# Patient Record
Sex: OTHER/UNKNOWN | Race: WHITE | URBAN - METROPOLITAN AREA
[De-identification: names, ages, dates, MRNs, and addresses within clinical notes are randomized per-mention and may not be internally consistent; named-entity substitution may affect disease eponyms.]

---

## 2017-01-18 ENCOUNTER — APPOINTMENT (OUTPATIENT)
Dept: URBAN - METROPOLITAN AREA CLINIC 290 | Age: 52
Setting detail: DERMATOLOGY
End: 2017-01-19

## 2017-01-18 DIAGNOSIS — Z41.9 ENCOUNTER FOR PROCEDURE FOR PURPOSES OTHER THAN REMEDYING HEALTH STATE, UNSPECIFIED: ICD-10-CM

## 2017-01-18 PROCEDURE — OTHER BOTOX (U OR CC): OTHER

## 2017-01-18 PROCEDURE — OTHER FILLERS (CC): OTHER

## 2017-01-18 PROCEDURE — OTHER OTHER (COSMETIC): OTHER

## 2017-01-18 PROCEDURE — OTHER PRESCRIPTION: OTHER

## 2017-01-18 RX ORDER — PREDNISONE 20 MG/1
TABLET ORAL
Qty: 2 | Refills: 0 | Status: ERX | COMMUNITY
Start: 2017-01-18

## 2017-01-18 NOTE — PROCEDURE: OTHER (COSMETIC)
Detail Level: Zone
Other (Free Text): Patient aware that he may need another half syringe of Botox in the crows feet.

## 2017-01-18 NOTE — PROCEDURE: FILLERS (CC)
Consent: Written consent obtained. Risks include but not limited to bruising, beading, irregular texture, ulceration, infection, allergic reaction, scar formation, incomplete augmentation, temporary nature, procedural pain.\\n\\nPatient prepped in a sterile fashion, photos taken and consent signed.

## 2017-03-16 ENCOUNTER — APPOINTMENT (OUTPATIENT)
Dept: URBAN - METROPOLITAN AREA CLINIC 290 | Age: 52
Setting detail: DERMATOLOGY
End: 2017-03-21

## 2017-03-16 DIAGNOSIS — Z41.9 ENCOUNTER FOR PROCEDURE FOR PURPOSES OTHER THAN REMEDYING HEALTH STATE, UNSPECIFIED: ICD-10-CM

## 2017-03-16 PROCEDURE — OTHER BOTOX (U OR CC): OTHER

## 2017-03-16 NOTE — PROCEDURE: BOTOX (U OR CC)
Detail Level: Zone
Additional Area 4 Location: Right lower lid
Additional Area 1 Location: Crows feet
Consent: Verbal consent obtained. Risks include but not limited to lid/brow ptosis, bruising, swelling, diplopia, temporary effect, incomplete chemical denervation.
Additional Area 3 Units: 0
Document As Units Or Cc?: units
Dilution (U/0.1 Cc): 2.4
Additional Area 2 Location: Glabella
Price (Use Numbers Only, No Special Characters Or $): 400.00
Additional Area 2 Units: 20
Additional Area 3 Location: Lip

## 2017-05-19 ENCOUNTER — APPOINTMENT (OUTPATIENT)
Dept: URBAN - METROPOLITAN AREA CLINIC 290 | Age: 52
Setting detail: DERMATOLOGY
End: 2017-05-21

## 2017-05-19 DIAGNOSIS — Z41.9 ENCOUNTER FOR PROCEDURE FOR PURPOSES OTHER THAN REMEDYING HEALTH STATE, UNSPECIFIED: ICD-10-CM

## 2017-05-19 PROCEDURE — OTHER FILLERS: OTHER

## 2017-05-19 NOTE — PROCEDURE: FILLERS
Price (Use Numbers Only, No Special Characters Or $): 8404 Price (Use Numbers Only, No Special Characters Or $): 1159

## 2017-05-25 ENCOUNTER — APPOINTMENT (OUTPATIENT)
Dept: URBAN - METROPOLITAN AREA CLINIC 290 | Age: 52
Setting detail: DERMATOLOGY
End: 2017-05-29

## 2017-05-25 PROBLEM — D23.72 OTHER BENIGN NEOPLASM OF SKIN OF LEFT LOWER LIMB, INCLUDING HIP: Status: ACTIVE | Noted: 2017-05-25

## 2017-05-25 PROCEDURE — 11100: CPT

## 2017-05-25 PROCEDURE — OTHER BIOPSY BY SHAVE METHOD: OTHER

## 2017-05-25 PROCEDURE — OTHER COUNSELING: OTHER

## 2017-05-25 NOTE — PROCEDURE: BIOPSY BY SHAVE METHOD
Biopsy Type: H and E
Notification Instructions: Patient will be notified of biopsy results. However, patient instructed to call the office if not contacted within 2 weeks.
Hemostasis: Aluminum Chloride and Electrocautery
Bill 52083 For Specimen Handling/Conveyance To Laboratory?: no
Additional Anesthesia Volume In Cc (Will Not Render If 0): 0
Path Notes (To The Dermatopathologist): 6mm lipkin
Wound Care: Aquaphor
Curettage Text: The wound bed was treated with curettage after the biopsy was performed.
Silver Nitrate Text: The wound bed was treated with silver nitrate after the biopsy was performed.
Detail Level: Detailed
Anesthesia Volume In Cc (Will Not Render If 0): 1
Size Of Lesion In Cm: 0.6
Post-Care Instructions: I reviewed with the patient in detail post-care instructions. Patient is to keep the biopsy site dry overnight, and then apply Vaseline twice daily until healed.
Cryotherapy Text: The wound bed was treated with cryotherapy after the biopsy was performed.
Consent: Written consent was obtained and risks were reviewed including but not limited to scarring, infection, bleeding, scabbing, incomplete removal, nerve damage and allergy to anesthesia.
Electrodesiccation And Curettage Text: The wound bed was treated with electrodesiccation and curettage after the biopsy was performed.
Electrodesiccation Text: The wound bed was treated with electrodesiccation after the biopsy was performed.
Billing Type: Third-Party Bill
Type Of Destruction Used: Curettage
Anesthesia Type: 1% lidocaine with 1:100,000 epinephrine and a 1:6 solution of 8.4% sodium bicarbonate
Biopsy Method: Dermablade
Dressing: waterproof dressing

## 2017-07-13 ENCOUNTER — APPOINTMENT (OUTPATIENT)
Dept: URBAN - METROPOLITAN AREA CLINIC 290 | Age: 52
Setting detail: DERMATOLOGY
End: 2017-07-18

## 2017-07-13 DIAGNOSIS — Z41.9 ENCOUNTER FOR PROCEDURE FOR PURPOSES OTHER THAN REMEDYING HEALTH STATE, UNSPECIFIED: ICD-10-CM

## 2017-07-13 PROBLEM — D23.72 OTHER BENIGN NEOPLASM OF SKIN OF LEFT LOWER LIMB, INCLUDING HIP: Status: ACTIVE | Noted: 2017-07-13

## 2017-07-13 PROCEDURE — OTHER OTHER: OTHER

## 2017-07-13 PROCEDURE — OTHER COUNSELING: OTHER

## 2017-07-13 PROCEDURE — OTHER BOTOX (U OR CC): OTHER

## 2017-07-13 PROCEDURE — OTHER DEFER: OTHER

## 2017-07-13 PROCEDURE — 99213 OFFICE O/P EST LOW 20 MIN: CPT

## 2017-07-13 NOTE — PROCEDURE: BOTOX (U OR CC)
Detail Level: Zone
Additional Area 2 Units: 20
Forehead Units/Cc: 0
Consent: Verbal consent obtained. Risks include but not limited to lid/brow ptosis, bruising, swelling, diplopia, temporary effect, incomplete chemical denervation.
Additional Area 5 Location: Lip
Price (Use Numbers Only, No Special Characters Or $): 450
Additional Area 2 Location: crow's feet
Additional Area 1 Location: glabella
Document As Units Or Cc?: units
Additional Area 1 Units: 25
Dilution (U/0.1 Cc): 2.4

## 2017-07-13 NOTE — PROCEDURE: OTHER
Detail Level: Zone
Note Text (......Xxx Chief Complaint.): This diagnosis correlates with the
Other (Free Text): Warned patient of risk of recurrence and/or scarring.

## 2017-07-13 NOTE — PROCEDURE: DEFER
Instructions (Optional): Dermatofibroma is itchy and rubs on clothing
Procedure To Be Performed At Next Visit: Biopsy by punch method
Detail Level: Detailed

## 2017-08-10 ENCOUNTER — APPOINTMENT (OUTPATIENT)
Dept: URBAN - METROPOLITAN AREA CLINIC 290 | Age: 52
Setting detail: DERMATOLOGY
End: 2017-08-20

## 2017-08-10 DIAGNOSIS — Z41.9 ENCOUNTER FOR PROCEDURE FOR PURPOSES OTHER THAN REMEDYING HEALTH STATE, UNSPECIFIED: ICD-10-CM

## 2017-08-10 PROCEDURE — OTHER BOTOX (U OR CC): OTHER

## 2017-08-10 PROCEDURE — OTHER OTHER: OTHER

## 2017-08-10 NOTE — PROCEDURE: OTHER
Other (Free Text): Recommended voluma for under eyes to treat the area around right eye that he's concerned about
Note Text (......Xxx Chief Complaint.): This diagnosis correlates with the
Detail Level: Zone

## 2017-08-10 NOTE — PROCEDURE: BOTOX (U OR CC)
Depressor Anguli Oris Units: 0
Detail Level: Zone
Dilution (U/0.1 Cc): 2.4
Additional Area 5 Location: Lip
Document As Units Or Cc?: units
Additional Area 2 Location: crow's feet
Consent: Verbal consent obtained. Risks include but not limited to lid/brow ptosis, bruising, swelling, diplopia, temporary effect, incomplete chemical denervation.
Additional Area 2 Units: 5

## 2017-12-07 ENCOUNTER — APPOINTMENT (OUTPATIENT)
Dept: URBAN - METROPOLITAN AREA CLINIC 290 | Age: 52
Setting detail: DERMATOLOGY
End: 2017-12-21

## 2017-12-07 DIAGNOSIS — Z41.9 ENCOUNTER FOR PROCEDURE FOR PURPOSES OTHER THAN REMEDYING HEALTH STATE, UNSPECIFIED: ICD-10-CM

## 2017-12-07 PROCEDURE — OTHER BOTOX (U OR CC): OTHER

## 2017-12-07 NOTE — PROCEDURE: BOTOX (U OR CC)
Additional Area 1 Location: [facility] Additional Area 1 Location: [facility]

## 2018-05-02 ENCOUNTER — APPOINTMENT (OUTPATIENT)
Dept: URBAN - METROPOLITAN AREA CLINIC 290 | Age: 53
Setting detail: DERMATOLOGY
End: 2018-05-11

## 2018-05-02 DIAGNOSIS — Z41.9 ENCOUNTER FOR PROCEDURE FOR PURPOSES OTHER THAN REMEDYING HEALTH STATE, UNSPECIFIED: ICD-10-CM

## 2018-05-02 PROCEDURE — OTHER BOTOX (U OR CC): OTHER

## 2018-05-02 NOTE — PROCEDURE: BOTOX (U OR CC)
Additional Area 4 Location: [facility] Additional Area 4 Location: [facility]

## 2018-06-06 ENCOUNTER — APPOINTMENT (OUTPATIENT)
Dept: URBAN - METROPOLITAN AREA CLINIC 290 | Age: 53
Setting detail: DERMATOLOGY
End: 2018-06-15

## 2018-06-06 DIAGNOSIS — Z41.9 ENCOUNTER FOR PROCEDURE FOR PURPOSES OTHER THAN REMEDYING HEALTH STATE, UNSPECIFIED: ICD-10-CM

## 2018-06-06 PROCEDURE — OTHER OTHER (COSMETIC): OTHER

## 2018-06-06 PROCEDURE — OTHER FILLERS: OTHER

## 2018-06-06 NOTE — PROCEDURE: FILLERS
Price (Use Numbers Only, No Special Characters Or $): 8057 Price (Use Numbers Only, No Special Characters Or $): 5290

## 2018-06-27 ENCOUNTER — APPOINTMENT (OUTPATIENT)
Dept: URBAN - METROPOLITAN AREA CLINIC 290 | Age: 53
Setting detail: DERMATOLOGY
End: 2018-07-08

## 2018-06-27 DIAGNOSIS — Z41.9 ENCOUNTER FOR PROCEDURE FOR PURPOSES OTHER THAN REMEDYING HEALTH STATE, UNSPECIFIED: ICD-10-CM

## 2018-06-27 PROCEDURE — OTHER BOTOX (U OR CC): OTHER

## 2018-06-27 NOTE — PROCEDURE: BOTOX (U OR CC)
Additional Area 4 Location: [facility] Additional Area 4 Location: [facility]

## 2018-09-21 ENCOUNTER — APPOINTMENT (OUTPATIENT)
Dept: URBAN - METROPOLITAN AREA CLINIC 290 | Age: 53
Setting detail: DERMATOLOGY
End: 2018-09-22

## 2018-09-21 DIAGNOSIS — Z41.9 ENCOUNTER FOR PROCEDURE FOR PURPOSES OTHER THAN REMEDYING HEALTH STATE, UNSPECIFIED: ICD-10-CM

## 2018-09-21 PROCEDURE — OTHER OTHER (COSMETIC): OTHER

## 2018-09-21 PROCEDURE — OTHER BOTOX (U OR CC): OTHER

## 2018-09-21 NOTE — PROCEDURE: BOTOX (U OR CC)
Price (Use Numbers Only, No Special Characters Or $): 888 Price (Use Numbers Only, No Special Characters Or $): 966

## 2019-01-31 ENCOUNTER — APPOINTMENT (OUTPATIENT)
Dept: URBAN - METROPOLITAN AREA CLINIC 290 | Age: 54
Setting detail: DERMATOLOGY
End: 2019-02-09

## 2019-01-31 DIAGNOSIS — Z41.9 ENCOUNTER FOR PROCEDURE FOR PURPOSES OTHER THAN REMEDYING HEALTH STATE, UNSPECIFIED: ICD-10-CM

## 2019-01-31 PROCEDURE — OTHER BOTOX (U OR CC): OTHER

## 2019-01-31 NOTE — PROCEDURE: BOTOX (U OR CC)
Price (Use Numbers Only, No Special Characters Or $): 039 Price (Use Numbers Only, No Special Characters Or $): 358

## 2019-01-31 NOTE — PROCEDURE: BOTOX (U OR CC)
Additional Area 4 Location: [facility] Additional Area 4 Location: [facility]

## 2019-05-20 ENCOUNTER — APPOINTMENT (OUTPATIENT)
Dept: URBAN - METROPOLITAN AREA CLINIC 290 | Age: 54
Setting detail: DERMATOLOGY
End: 2019-05-22

## 2019-05-20 DIAGNOSIS — Z41.9 ENCOUNTER FOR PROCEDURE FOR PURPOSES OTHER THAN REMEDYING HEALTH STATE, UNSPECIFIED: ICD-10-CM

## 2019-05-20 PROCEDURE — OTHER BOTOX (U OR CC): OTHER

## 2019-05-20 NOTE — PROCEDURE: BOTOX (U OR CC)
Price (Use Numbers Only, No Special Characters Or $): 311 Price (Use Numbers Only, No Special Characters Or $): 493

## 2019-05-20 NOTE — PROCEDURE: BOTOX (U OR CC)
Additional Area 4 Location: [facility] Additional Area 4 Location: [facility]